# Patient Record
(demographics unavailable — no encounter records)

---

## 2025-02-08 NOTE — DISCUSSION/SUMMARY
[de-identified] : Right ankle pain  HPI Patient is a 52-year old female accompanied by her daughter who reports to office for evaluation of right ankle pain since yesterday, 2/7/2025.  She states that she was walking and a dark area and twisted her right foot/ankle in a hole that she did not see.  This caused her to fall and land forwards.  Since then, walking, range of motion, palpating certain areas of the ankle aggravate the patient's pain at times.  Admits to ankle swelling.  Denies any numbness or tingling.  Weightbearing right ankle x-rays taken in office today revealed a questionable nondisplaced fracture noted of the distal fibula best seen on lateral view.  Moderate lateral ankle soft tissue swelling noted.  Calcaneal heel spur also noted.  Otherwise, no other significant abnormalities were seen.  Right ankle/foot exam is as follows: Moderate lateral ankle swelling noted.  No erythema or ecchymosis.  Mild limited ROM of ankle secondary to swelling/pain.  TTP over lateral malleolus and lateral ligaments.  No calf pain.  Intact Achilles with negative Porter's test.  Light touch intact throughout.  Mild antalgic gait.  Assessment/plan Explained x-ray findings.  She clinically also sprained her ankle.  Gentle ROM exercises and Epson salt and warm water was encouraged.  Explained to the patient that this may take 4 to 6 weeks to fully heal and that the first 2 weeks are usually the worst.  She was provided with a tall cam walker boot and may weight-bear as tolerated.  The patient was advised to rest/ice the area and may alternate with warm compresses as needed.  Ibuprofen 800 mg Rx sent to her pharmacy to help alleviate her symptoms.  Follow-up in 2 to 3 weeks for repeat weightbearing right ankle x-rays and further evaluation.  All questions/concerns were answered in detail. At may worsen symptoms.

## 2025-02-27 NOTE — DISCUSSION/SUMMARY
[de-identified] : At this time I recommend she wean out of the boot.  I would like her to start doing some physical therapy.  I will see her back in a few weeks for repeat evaluation if she is still having pain. Patient will call me if any other problems or concerns.  Patient verbalized understanding and agreed with the plan, all questions were answered in the office today.

## 2025-02-27 NOTE — IMAGING
[de-identified] : On examination of her right ankle she still has some swelling and resolving ecchymosis of the ankle.  She is mildly tender over the lateral malleolus, she is tender over the ATFL and CFL.  She has some tenderness over the talar dome and over the syndesmosis.  No tenderness over the medial malleolus or the deltoid ligament.  No tenderness over the Achilles or the calcaneus, negative Porter's test, no calf tenderness.  She has no tenderness to palpation of the foot.  No tenderness over the Lisfranc joint.  She flex and plantarflex ankle but has some decreased range of motion.  Sensation is intact throughout, 2+ DP and PT pulses.  X-rays repeated in the office today of the right ankle show slight lucency of the distal fibula suspicious for nondisplaced fracture.  No other fractures noted.  The mortise is congruent, there is no widening of the medial clear space or the syndesmosis.

## 2025-02-27 NOTE — HISTORY OF PRESENT ILLNESS
[de-identified] : 52-year-old female is here today for follow-up of her right ankle injury.  About 3 weeks ago she was in a dark area and stepped in a hole and fell forward hyper flexing her right ankle.  She was seen in the walk-in and was placed in a cam walker boot.  She states she is feeling a little bit better but is still having pain.  She denies any new injury or trauma.  She denies any numbness tingling or any calf pain.

## 2025-05-23 NOTE — HISTORY OF PRESENT ILLNESS
[de-identified] : Referred by:   HPI: __ year old female/male here for his/her initial medical weight loss appointment. PMHx is significant for   Weight History: Highest Weight: Lowest Weight: Current Weight:   Previous Weight Loss Efforts:   Previous use of AOMs:   Medications that may have contributed to weight gain:     Pertinent Labs:   Review of Systems:   Eating behaviors:     24 Hr Recall: B: L: D: Cathy:   Movement:   SocHx:   Sleep:   Mental Health:   Reproductive/Preventive Screenings:

## 2025-05-23 NOTE — HISTORY OF PRESENT ILLNESS
[de-identified] : Referred by:   HPI: __ year old female/male here for his/her initial medical weight loss appointment. PMHx is significant for   Weight History: Highest Weight: Lowest Weight: Current Weight:   Previous Weight Loss Efforts:   Previous use of AOMs:   Medications that may have contributed to weight gain:     Pertinent Labs:   Review of Systems:   Eating behaviors:     24 Hr Recall: B: L: D: Cathy:   Movement:   SocHx:   Sleep:   Mental Health:   Reproductive/Preventive Screenings:

## 2025-07-29 NOTE — HISTORY OF PRESENT ILLNESS
[de-identified] : Referred: Dr. Hogue  HPI: 52 year old female here for her f/u medical weight loss appointment. PMHx is significant for class 3 obesity, prediabetes, chronic back pain, B/L knee OA, metabolic syndrome, low HDL, migraines, DONNA, post-menopausal and recurrent sinusitis.  Weight History: Highest Weight: 263 lbs (May 2025) Lowest Weight: 180 lbs (before children) Weight at [facility]: 263 lbs/46.59 kg/m2 (May 2025) Current weight: 258 lbs    Review of Systems is also notable for: Chronic pain managed with steroid shots Hands and feet swell Sciatica in B/L LEs Numbness in the 2 pinky toes +Migraines 1x/month Constipation +hot flashes Occ GERD +SOB in the winter and with stairs  Previous Weight Loss Efforts: Has struggled with her weight for years. Has been in the 260s for years - ~10-15 yrs. Has tried several diets in the past with minimal success, including WWs for greater than 6 mos and portion control.  No personal hx of kidney stones  Previous use of AOMs: Denies Medications that may have contributed to weight gain: denies  Pertinent [facility] 2024: FBG 98 Hemoglobin A1c 5.8 H Vit B12 437 TChol 131 TGs 107 HDL 40 L LDL 70  Eating behaviors: +Emotional eating Stress eating Craves sugar - wakes her up from sleep Is a "" Does not like fruit Cooks every day Does not like seafood Struggles most with snacking after dinner - especially chocolate while watching tv  Movement: Decreased physical activity 2/2 back pain - was unable to move until she got steroid shots - now "50% better" and can walk Walks a lot at work  SocHx: Lives with  and 2 adult children (23 and 20) 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) Denies alcohol Never smoker Has 2 dogs - [place] jamie and poodle  Sleep: Has sugar cravings that wake her up at night - satisfies her cravings with milk Goes to bed between 11-12AM Has broken sleep Cannot fall asleep until her children are home Wakes up at 5 AM Insomnia - mind races +snores  Mental Health: Has had many losses in recent years - Lost her dad, sister --> emotional eating  recently had heart surgery Mother  of breast cancer Has a history of panic attacks - now works through them - had many as a young adult +Anxiety "plows through things" Worked with a therapist when she was younger  Reproductive/Preventive Screenings:  Pap UTD Colonoscopy UTD Mammo UTD LMP 49 yrs.

## 2025-07-29 NOTE — HISTORY OF PRESENT ILLNESS
[de-identified] : Referred: Dr. Hogeu  HPI: 52 year old female here for her f/u medical weight loss appointment. PMHx is significant for class 3 obesity, prediabetes, chronic back pain, B/L knee OA, metabolic syndrome, low HDL, migraines, DONNA, post-menopausal and recurrent sinusitis.  Weight History: Highest Weight: 263 lbs (May 2025) Lowest Weight: 180 lbs (before children) Weight at [facility]: 263 lbs/46.59 kg/m2 (May 2025) Current weight: 258 lbs    Review of Systems is also notable for: Chronic pain managed with steroid shots Hands and feet swell Sciatica in B/L LEs Numbness in the 2 pinky toes +Migraines 1x/month Constipation +hot flashes Occ GERD +SOB in the winter and with stairs  Previous Weight Loss Efforts: Has struggled with her weight for years. Has been in the 260s for years - ~10-15 yrs. Has tried several diets in the past with minimal success, including WWs for greater than 6 mos and portion control.  No personal hx of kidney stones  Previous use of AOMs: Denies Medications that may have contributed to weight gain: denies  Pertinent [facility] 2024: FBG 98 Hemoglobin A1c 5.8 H Vit B12 437 TChol 131 TGs 107 HDL 40 L LDL 70  Eating behaviors: +Emotional eating Stress eating Craves sugar - wakes her up from sleep Is a "" Does not like fruit Cooks every day Does not like seafood Struggles most with snacking after dinner - especially chocolate while watching tv  Movement: Decreased physical activity 2/2 back pain - was unable to move until she got steroid shots - now "50% better" and can walk Walks a lot at work  SocHx: Lives with  and 2 adult children (23 and 20) 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) Denies alcohol Never smoker Has 2 dogs - [place] jamie and poodle  Sleep: Has sugar cravings that wake her up at night - satisfies her cravings with milk Goes to bed between 11-12AM Has broken sleep Cannot fall asleep until her children are home Wakes up at 5 AM Insomnia - mind races +snores  Mental Health: Has had many losses in recent years - Lost her dad, sister --> emotional eating  recently had heart surgery Mother  of breast cancer Has a history of panic attacks - now works through them - had many as a young adult +Anxiety "plows through things" Worked with a therapist when she was younger  Reproductive/Preventive Screenings:  Pap UTD Colonoscopy UTD Mammo UTD LMP 49 yrs.

## 2025-07-29 NOTE — ASSESSMENT
[FreeTextEntry1] :  A/P: 52 year old female here for her f/u medical weight loss appointment. PMHx is significant for class 3 obesity, prediabetes, chronic back pain, B/L knee OA, metabolic syndrome, low HDL, migraines, DONNA, post-menopausal and recurrent sinusitis.    -Will work together with an interdisciplinary team to help achieve their weight loss goals. -Will connect with RD to help optimize nutrition and to increase protein as tolerated. Will plan to increase her water intake +/- Crystal Light For management of prediabetes, will try metformin  mg. Discussed possible side effects including gastrointestinal upset, especially diarrhea. Will start with one tab with breakfast for one week. Increase to one tab with breakfast and one tab with dinner the second week. Rx sent today.  In 2 weeks will add topiramate. May also help with migraines.  Given cravings vs night eating vs binging, will trial topiramate. Discussed possible side effects including sedation, numbness and tingling in the feet and hands that tends to get better with time, and change in taste perception (especially of alcohol and carbonated drinks). Is post-menopausal. Will start with one tab (25 mg) with dinner for one week and will increase to 2 tabs with dinner the second week. Rx sent today.  Will also try to get approval for Zepbound.  Will start with 2.5 mg sc once weekly x 4 weeks and then increase to 5 mg sc weekly thereafter. Discussed possible side effects, including constipation, worsening reflux, gastroparesis and low risk of hypoglycemia, pain at injection site. Also discussed possible inflammation of their pancreas, low risk, but can occur. No personal or family history of medullary thyroid cancer. Rx sent. Low HDL/ Metabolic Syndrome - continue to monitor with weight loss -Encouraged her to increase her physical activity as tolerated DONNA - sleep test revealed mild DONNA - does not meet FDA criteria for approval of Zepbound for tx of DONNA    F/U with RD first available F/U with MD in ~6-8 weeks   Patient was seen by Dr. Wilks on 7/29/25. Time spent with patient was greater than 30 minutes, including chart review, time spent with patient, and charting.